# Patient Record
Sex: FEMALE | Race: ASIAN | NOT HISPANIC OR LATINO | URBAN - METROPOLITAN AREA
[De-identification: names, ages, dates, MRNs, and addresses within clinical notes are randomized per-mention and may not be internally consistent; named-entity substitution may affect disease eponyms.]

---

## 2022-11-30 ENCOUNTER — OFFICE VISIT (OUTPATIENT)
Dept: URBAN - METROPOLITAN AREA CLINIC 96 | Facility: CLINIC | Age: 77
End: 2022-11-30

## 2023-01-20 ENCOUNTER — DASHBOARD ENCOUNTERS (OUTPATIENT)
Age: 78
End: 2023-01-20

## 2023-01-20 ENCOUNTER — LAB OUTSIDE AN ENCOUNTER (OUTPATIENT)
Dept: URBAN - METROPOLITAN AREA CLINIC 96 | Facility: CLINIC | Age: 78
End: 2023-01-20

## 2023-01-20 ENCOUNTER — OFFICE VISIT (OUTPATIENT)
Dept: URBAN - METROPOLITAN AREA CLINIC 96 | Facility: CLINIC | Age: 78
End: 2023-01-20
Payer: MEDICARE

## 2023-01-20 VITALS
TEMPERATURE: 98.6 F | DIASTOLIC BLOOD PRESSURE: 84 MMHG | HEART RATE: 78 BPM | SYSTOLIC BLOOD PRESSURE: 159 MMHG | BODY MASS INDEX: 31.61 KG/M2 | WEIGHT: 161 LBS | HEIGHT: 60 IN

## 2023-01-20 DIAGNOSIS — K57.30 DIVERTICULA OF COLON: ICD-10-CM

## 2023-01-20 DIAGNOSIS — Z12.11 COLON CANCER SCREENING: ICD-10-CM

## 2023-01-20 DIAGNOSIS — K21.9 GASTROESOPHAGEAL REFLUX DISEASE, UNSPECIFIED WHETHER ESOPHAGITIS PRESENT: ICD-10-CM

## 2023-01-20 PROBLEM — 733657002: Status: ACTIVE | Noted: 2023-01-20

## 2023-01-20 PROBLEM — 235595009: Status: ACTIVE | Noted: 2023-01-20

## 2023-01-20 PROBLEM — 72042002: Status: ACTIVE | Noted: 2023-01-20

## 2023-01-20 PROCEDURE — 99204 OFFICE O/P NEW MOD 45 MIN: CPT | Performed by: INTERNAL MEDICINE

## 2023-01-20 NOTE — HPI-TODAY'S VISIT:
This is is a 78-year-old female referred for GI consultation for colon cancer screening and a copy will be sent to the referring provider who is Dr. Curry Snog.  Patient was seen in 2019 and had a colonoscopy previously in 6/2014 by Dr. Avelar.  That exam revealed internal hemorrhoids and diverticulosis.  Patient had complained of some fecal incontinence and reflux as well when I met her.  She did not want to be on PPIs however.  I ordered both upper endoscopy and colonoscopy.  She did not do either procedure after I had met her. Pt wants to schedule her colonoscopy. Pt no loner having reflux anymore. Pt is working on lifestyle modifications. Pt moves her bowels 1-2x a day. Pt is on famotidine and no dysphagia. Pt has urinary incontinence and rare fecal incontinence.

## 2023-10-11 ENCOUNTER — OFFICE VISIT (OUTPATIENT)
Dept: URBAN - METROPOLITAN AREA CLINIC 96 | Facility: CLINIC | Age: 78
End: 2023-10-11